# Patient Record
Sex: FEMALE | Race: BLACK OR AFRICAN AMERICAN | NOT HISPANIC OR LATINO | Employment: FULL TIME | URBAN - METROPOLITAN AREA
[De-identification: names, ages, dates, MRNs, and addresses within clinical notes are randomized per-mention and may not be internally consistent; named-entity substitution may affect disease eponyms.]

---

## 2022-02-16 ENCOUNTER — SOCIAL WORK (OUTPATIENT)
Dept: ADMINISTRATIVE | Facility: OTHER | Age: 27
End: 2022-02-16

## 2022-02-16 NOTE — PROGRESS NOTES
SW met with pt regarding initial OB assessment. Pt stated this is her 2nd pregnancy/0-miscarriage. Pt stated lives with her mother/child-4 and able to perform ADL's independently. Pt stated does work. Pt stated support system is her mother/Maia. Pt stated has medicaid([facility]). Pt stated does not have WIC. Pt stated is going to breastfeed. SW provide pt with information on other community resources.SW faxed and scanned pt's notification of pregnancy into epic.  No other needs identified at this time.    Nadege Kumari,MSW  Pager#[phone]

## 2022-02-28 PROBLEM — O98.211 MATERNAL GONORRHEA IN FIRST TRIMESTER: Status: ACTIVE | Noted: 2022-02-28

## 2022-02-28 PROBLEM — Z78.9 NOT IMMUNE TO RUBELLA: Status: ACTIVE | Noted: 2022-02-28

## 2022-03-02 PROBLEM — O99.011 ANEMIA AFFECTING PREGNANCY IN FIRST TRIMESTER: Status: ACTIVE | Noted: 2022-03-02

## 2022-03-02 PROBLEM — O09.91 SUPERVISION OF HIGH RISK PREGNANCY IN FIRST TRIMESTER: Status: ACTIVE | Noted: 2022-03-02

## 2022-03-07 ENCOUNTER — PATIENT MESSAGE (OUTPATIENT)
Dept: ADMINISTRATIVE | Facility: OTHER | Age: 27
End: 2022-03-07

## 2022-03-10 PROBLEM — R87.612 LGSIL ON PAP SMEAR OF CERVIX: Status: ACTIVE | Noted: 2022-03-10

## 2022-03-17 PROBLEM — O99.019 IRON DEFICIENCY ANEMIA DURING PREGNANCY: Status: ACTIVE | Noted: 2022-03-17

## 2022-03-17 PROBLEM — D50.9 IRON DEFICIENCY ANEMIA DURING PREGNANCY: Status: ACTIVE | Noted: 2022-03-17

## 2022-03-30 PROBLEM — O09.92 SUPERVISION OF HIGH RISK PREGNANCY IN SECOND TRIMESTER: Status: ACTIVE | Noted: 2022-03-02

## 2022-03-30 PROBLEM — O99.012 ANEMIA AFFECTING PREGNANCY IN SECOND TRIMESTER: Status: ACTIVE | Noted: 2022-03-02

## 2022-04-07 ENCOUNTER — SOCIAL WORK (OUTPATIENT)
Dept: ADMINISTRATIVE | Facility: OTHER | Age: 27
End: 2022-04-07

## 2022-04-07 NOTE — PROGRESS NOTES
SW received pt's FMLA paperwork and pt's mother(Maia Celeste)FMLA Paperwork from the pt.. SW completed the demographic sheet on pt's FMLA paperwork & pt's mother FMLA paperwork and gave the paperwork to MD for review/signature.PARUL received pt's completed FMLA Paperwork & pt's mother FMLA Paperwork from MD and provide pt with the original paperwork. PARUL scanned paperwork into epic.. No other needs identified at this time.    Nadege Kumari,MSW  Pager#[phone]

## 2022-06-06 ENCOUNTER — PATIENT MESSAGE (OUTPATIENT)
Dept: ADMINISTRATIVE | Facility: OTHER | Age: 27
End: 2022-06-06

## 2022-07-19 PROBLEM — O9A.213 TRAUMATIC INJURY DURING PREGNANCY IN THIRD TRIMESTER: Status: ACTIVE | Noted: 2022-07-19

## 2022-08-11 PROBLEM — O99.012 ANEMIA AFFECTING PREGNANCY IN SECOND TRIMESTER: Status: RESOLVED | Noted: 2022-03-02 | Resolved: 2022-08-11

## 2022-09-13 PROBLEM — D62 ACUTE BLOOD LOSS ANEMIA: Status: ACTIVE | Noted: 2022-09-13

## 2022-10-25 PROBLEM — O09.92 SUPERVISION OF HIGH RISK PREGNANCY IN SECOND TRIMESTER: Status: RESOLVED | Noted: 2022-03-02 | Resolved: 2022-10-25

## 2022-10-25 PROBLEM — O9A.213 TRAUMATIC INJURY DURING PREGNANCY IN THIRD TRIMESTER: Status: RESOLVED | Noted: 2022-07-19 | Resolved: 2022-10-25

## 2022-10-25 PROBLEM — D62 ACUTE BLOOD LOSS ANEMIA: Status: RESOLVED | Noted: 2022-09-13 | Resolved: 2022-10-25

## 2022-10-26 PROBLEM — F33.2 MAJOR DEPRESSIVE DISORDER, RECURRENT SEVERE WITHOUT PSYCHOTIC FEATURES: Status: ACTIVE | Noted: 2022-10-26

## 2023-01-19 PROBLEM — Z78.9 NOT IMMUNE TO RUBELLA: Status: RESOLVED | Noted: 2022-02-28 | Resolved: 2023-01-19

## 2023-01-19 PROBLEM — Z30.46 NEXPLANON REMOVAL: Status: ACTIVE | Noted: 2023-01-19

## 2023-06-13 ENCOUNTER — PATIENT MESSAGE (OUTPATIENT)
Dept: RESEARCH | Facility: HOSPITAL | Age: 28
End: 2023-06-13

## 2023-06-20 ENCOUNTER — PATIENT MESSAGE (OUTPATIENT)
Dept: RESEARCH | Facility: HOSPITAL | Age: 28
End: 2023-06-20

## 2023-07-05 ENCOUNTER — PATIENT MESSAGE (OUTPATIENT)
Dept: RESEARCH | Facility: HOSPITAL | Age: 28
End: 2023-07-05

## 2023-07-19 ENCOUNTER — PATIENT MESSAGE (OUTPATIENT)
Dept: RESEARCH | Facility: HOSPITAL | Age: 28
End: 2023-07-19

## 2024-04-15 LAB — PAP RECOMMENDATION EXT: NORMAL

## 2024-05-29 ENCOUNTER — PATIENT OUTREACH (OUTPATIENT)
Dept: ADMINISTRATIVE | Facility: HOSPITAL | Age: 29
End: 2024-05-29